# Patient Record
Sex: MALE | Race: WHITE | NOT HISPANIC OR LATINO | URBAN - METROPOLITAN AREA
[De-identification: names, ages, dates, MRNs, and addresses within clinical notes are randomized per-mention and may not be internally consistent; named-entity substitution may affect disease eponyms.]

---

## 2021-01-09 ENCOUNTER — INPATIENT (INPATIENT)
Facility: HOSPITAL | Age: 51
LOS: 4 days | Discharge: HOME | End: 2021-01-14
Attending: STUDENT IN AN ORGANIZED HEALTH CARE EDUCATION/TRAINING PROGRAM | Admitting: STUDENT IN AN ORGANIZED HEALTH CARE EDUCATION/TRAINING PROGRAM
Payer: COMMERCIAL

## 2021-01-09 VITALS
DIASTOLIC BLOOD PRESSURE: 83 MMHG | HEART RATE: 94 BPM | OXYGEN SATURATION: 94 % | RESPIRATION RATE: 19 BRPM | SYSTOLIC BLOOD PRESSURE: 134 MMHG | WEIGHT: 220.46 LBS | TEMPERATURE: 98 F

## 2021-01-09 DIAGNOSIS — J96.91 RESPIRATORY FAILURE, UNSPECIFIED WITH HYPOXIA: ICD-10-CM

## 2021-01-09 DIAGNOSIS — J12.82 PNEUMONIA DUE TO CORONAVIRUS DISEASE 2019: ICD-10-CM

## 2021-01-09 PROCEDURE — 99285 EMERGENCY DEPT VISIT HI MDM: CPT

## 2021-01-09 NOTE — ED ADULT TRIAGE NOTE - CHIEF COMPLAINT QUOTE
Pt tested positive  for covid on Monday 1/4. Pt complaining of sob and low O2 sat at home. Afebrile at triage. Denies chest pain.

## 2021-01-10 LAB
ALBUMIN SERPL ELPH-MCNC: 3.7 G/DL — SIGNIFICANT CHANGE UP (ref 3.5–5.2)
ALBUMIN SERPL ELPH-MCNC: 4 G/DL — SIGNIFICANT CHANGE UP (ref 3.5–5.2)
ALP SERPL-CCNC: 67 U/L — SIGNIFICANT CHANGE UP (ref 30–115)
ALP SERPL-CCNC: 69 U/L — SIGNIFICANT CHANGE UP (ref 30–115)
ALT FLD-CCNC: 55 U/L — HIGH (ref 0–41)
ALT FLD-CCNC: 57 U/L — HIGH (ref 0–41)
ANION GAP SERPL CALC-SCNC: 11 MMOL/L — SIGNIFICANT CHANGE UP (ref 7–14)
ANION GAP SERPL CALC-SCNC: 13 MMOL/L — SIGNIFICANT CHANGE UP (ref 7–14)
AST SERPL-CCNC: 59 U/L — HIGH (ref 0–41)
AST SERPL-CCNC: 60 U/L — HIGH (ref 0–41)
BASE EXCESS BLDV CALC-SCNC: 5.2 MMOL/L — HIGH (ref -2–2)
BASOPHILS # BLD AUTO: 0.01 K/UL — SIGNIFICANT CHANGE UP (ref 0–0.2)
BASOPHILS # BLD AUTO: 0.01 K/UL — SIGNIFICANT CHANGE UP (ref 0–0.2)
BASOPHILS NFR BLD AUTO: 0.1 % — SIGNIFICANT CHANGE UP (ref 0–1)
BASOPHILS NFR BLD AUTO: 0.2 % — SIGNIFICANT CHANGE UP (ref 0–1)
BILIRUB SERPL-MCNC: 1 MG/DL — SIGNIFICANT CHANGE UP (ref 0.2–1.2)
BILIRUB SERPL-MCNC: 1 MG/DL — SIGNIFICANT CHANGE UP (ref 0.2–1.2)
BUN SERPL-MCNC: 12 MG/DL — SIGNIFICANT CHANGE UP (ref 10–20)
BUN SERPL-MCNC: 13 MG/DL — SIGNIFICANT CHANGE UP (ref 10–20)
CA-I SERPL-SCNC: 1.08 MMOL/L — LOW (ref 1.12–1.3)
CALCIUM SERPL-MCNC: 8.3 MG/DL — LOW (ref 8.5–10.1)
CALCIUM SERPL-MCNC: 8.8 MG/DL — SIGNIFICANT CHANGE UP (ref 8.5–10.1)
CHLORIDE SERPL-SCNC: 100 MMOL/L — SIGNIFICANT CHANGE UP (ref 98–110)
CHLORIDE SERPL-SCNC: 96 MMOL/L — LOW (ref 98–110)
CO2 SERPL-SCNC: 25 MMOL/L — SIGNIFICANT CHANGE UP (ref 17–32)
CO2 SERPL-SCNC: 25 MMOL/L — SIGNIFICANT CHANGE UP (ref 17–32)
CREAT SERPL-MCNC: 0.9 MG/DL — SIGNIFICANT CHANGE UP (ref 0.7–1.5)
CREAT SERPL-MCNC: 1 MG/DL — SIGNIFICANT CHANGE UP (ref 0.7–1.5)
D DIMER BLD IA.RAPID-MCNC: 146 NG/ML DDU — SIGNIFICANT CHANGE UP (ref 0–230)
EOSINOPHIL # BLD AUTO: 0 K/UL — SIGNIFICANT CHANGE UP (ref 0–0.7)
EOSINOPHIL # BLD AUTO: 0 K/UL — SIGNIFICANT CHANGE UP (ref 0–0.7)
EOSINOPHIL NFR BLD AUTO: 0 % — SIGNIFICANT CHANGE UP (ref 0–8)
EOSINOPHIL NFR BLD AUTO: 0 % — SIGNIFICANT CHANGE UP (ref 0–8)
GAS PNL BLDV: 133 MMOL/L — LOW (ref 136–145)
GAS PNL BLDV: SIGNIFICANT CHANGE UP
GLUCOSE SERPL-MCNC: 107 MG/DL — HIGH (ref 70–99)
GLUCOSE SERPL-MCNC: 125 MG/DL — HIGH (ref 70–99)
HCO3 BLDV-SCNC: 30 MMOL/L — HIGH (ref 22–29)
HCT VFR BLD CALC: 42.8 % — SIGNIFICANT CHANGE UP (ref 42–52)
HCT VFR BLD CALC: 47 % — SIGNIFICANT CHANGE UP (ref 42–52)
HCT VFR BLDA CALC: 46.3 % — HIGH (ref 34–44)
HGB BLD CALC-MCNC: 15.1 G/DL — SIGNIFICANT CHANGE UP (ref 14–18)
HGB BLD-MCNC: 14.6 G/DL — SIGNIFICANT CHANGE UP (ref 14–18)
HGB BLD-MCNC: 15.4 G/DL — SIGNIFICANT CHANGE UP (ref 14–18)
IMM GRANULOCYTES NFR BLD AUTO: 1.2 % — HIGH (ref 0.1–0.3)
IMM GRANULOCYTES NFR BLD AUTO: 1.4 % — HIGH (ref 0.1–0.3)
LACTATE BLDV-MCNC: 1.3 MMOL/L — SIGNIFICANT CHANGE UP (ref 0.5–1.6)
LDH SERPL L TO P-CCNC: 328 U/L — HIGH (ref 50–242)
LYMPHOCYTES # BLD AUTO: 0.51 K/UL — LOW (ref 1.2–3.4)
LYMPHOCYTES # BLD AUTO: 0.52 K/UL — LOW (ref 1.2–3.4)
LYMPHOCYTES # BLD AUTO: 7.6 % — LOW (ref 20.5–51.1)
LYMPHOCYTES # BLD AUTO: 9.9 % — LOW (ref 20.5–51.1)
MCHC RBC-ENTMCNC: 28.6 PG — SIGNIFICANT CHANGE UP (ref 27–31)
MCHC RBC-ENTMCNC: 28.9 PG — SIGNIFICANT CHANGE UP (ref 27–31)
MCHC RBC-ENTMCNC: 32.8 G/DL — SIGNIFICANT CHANGE UP (ref 32–37)
MCHC RBC-ENTMCNC: 34.1 G/DL — SIGNIFICANT CHANGE UP (ref 32–37)
MCV RBC AUTO: 84.8 FL — SIGNIFICANT CHANGE UP (ref 80–94)
MCV RBC AUTO: 87.2 FL — SIGNIFICANT CHANGE UP (ref 80–94)
MONOCYTES # BLD AUTO: 0.37 K/UL — SIGNIFICANT CHANGE UP (ref 0.1–0.6)
MONOCYTES # BLD AUTO: 0.58 K/UL — SIGNIFICANT CHANGE UP (ref 0.1–0.6)
MONOCYTES NFR BLD AUTO: 7.2 % — SIGNIFICANT CHANGE UP (ref 1.7–9.3)
MONOCYTES NFR BLD AUTO: 8.5 % — SIGNIFICANT CHANGE UP (ref 1.7–9.3)
NEUTROPHILS # BLD AUTO: 4.17 K/UL — SIGNIFICANT CHANGE UP (ref 1.4–6.5)
NEUTROPHILS # BLD AUTO: 5.62 K/UL — SIGNIFICANT CHANGE UP (ref 1.4–6.5)
NEUTROPHILS NFR BLD AUTO: 81.3 % — HIGH (ref 42.2–75.2)
NEUTROPHILS NFR BLD AUTO: 82.6 % — HIGH (ref 42.2–75.2)
NRBC # BLD: 0 /100 WBCS — SIGNIFICANT CHANGE UP (ref 0–0)
NRBC # BLD: 0 /100 WBCS — SIGNIFICANT CHANGE UP (ref 0–0)
PCO2 BLDV: 44 MMHG — SIGNIFICANT CHANGE UP (ref 41–51)
PH BLDV: 7.45 — HIGH (ref 7.26–7.43)
PLATELET # BLD AUTO: 179 K/UL — SIGNIFICANT CHANGE UP (ref 130–400)
PLATELET # BLD AUTO: 200 K/UL — SIGNIFICANT CHANGE UP (ref 130–400)
PO2 BLDV: 29 MMHG — SIGNIFICANT CHANGE UP (ref 20–40)
POTASSIUM BLDV-SCNC: 3.8 MMOL/L — SIGNIFICANT CHANGE UP (ref 3.3–5.6)
POTASSIUM SERPL-MCNC: 4.1 MMOL/L — SIGNIFICANT CHANGE UP (ref 3.5–5)
POTASSIUM SERPL-MCNC: 4.7 MMOL/L — SIGNIFICANT CHANGE UP (ref 3.5–5)
POTASSIUM SERPL-SCNC: 4.1 MMOL/L — SIGNIFICANT CHANGE UP (ref 3.5–5)
POTASSIUM SERPL-SCNC: 4.7 MMOL/L — SIGNIFICANT CHANGE UP (ref 3.5–5)
PROT SERPL-MCNC: 6.5 G/DL — SIGNIFICANT CHANGE UP (ref 6–8)
PROT SERPL-MCNC: 6.7 G/DL — SIGNIFICANT CHANGE UP (ref 6–8)
RBC # BLD: 5.05 M/UL — SIGNIFICANT CHANGE UP (ref 4.7–6.1)
RBC # BLD: 5.39 M/UL — SIGNIFICANT CHANGE UP (ref 4.7–6.1)
RBC # FLD: 11.9 % — SIGNIFICANT CHANGE UP (ref 11.5–14.5)
RBC # FLD: 12 % — SIGNIFICANT CHANGE UP (ref 11.5–14.5)
SAO2 % BLDV: 55 % — SIGNIFICANT CHANGE UP
SARS-COV-2 RNA SPEC QL NAA+PROBE: DETECTED
SODIUM SERPL-SCNC: 132 MMOL/L — LOW (ref 135–146)
SODIUM SERPL-SCNC: 138 MMOL/L — SIGNIFICANT CHANGE UP (ref 135–146)
TROPONIN T SERPL-MCNC: <0.01 NG/ML — SIGNIFICANT CHANGE UP
WBC # BLD: 5.13 K/UL — SIGNIFICANT CHANGE UP (ref 4.8–10.8)
WBC # BLD: 6.81 K/UL — SIGNIFICANT CHANGE UP (ref 4.8–10.8)
WBC # FLD AUTO: 5.13 K/UL — SIGNIFICANT CHANGE UP (ref 4.8–10.8)
WBC # FLD AUTO: 6.81 K/UL — SIGNIFICANT CHANGE UP (ref 4.8–10.8)

## 2021-01-10 PROCEDURE — 93010 ELECTROCARDIOGRAM REPORT: CPT

## 2021-01-10 PROCEDURE — 99223 1ST HOSP IP/OBS HIGH 75: CPT

## 2021-01-10 PROCEDURE — 71045 X-RAY EXAM CHEST 1 VIEW: CPT | Mod: 26

## 2021-01-10 PROCEDURE — 99497 ADVNCD CARE PLAN 30 MIN: CPT | Mod: 25

## 2021-01-10 RX ORDER — ENOXAPARIN SODIUM 100 MG/ML
40 INJECTION SUBCUTANEOUS AT BEDTIME
Refills: 0 | Status: DISCONTINUED | OUTPATIENT
Start: 2021-01-10 | End: 2021-01-10

## 2021-01-10 RX ORDER — DEXAMETHASONE 0.5 MG/5ML
6 ELIXIR ORAL ONCE
Refills: 0 | Status: COMPLETED | OUTPATIENT
Start: 2021-01-10 | End: 2021-01-10

## 2021-01-10 RX ORDER — ENOXAPARIN SODIUM 100 MG/ML
40 INJECTION SUBCUTANEOUS EVERY 12 HOURS
Refills: 0 | Status: DISCONTINUED | OUTPATIENT
Start: 2021-01-10 | End: 2021-01-14

## 2021-01-10 RX ORDER — ALBUTEROL 90 UG/1
2 AEROSOL, METERED ORAL EVERY 6 HOURS
Refills: 0 | Status: DISCONTINUED | OUTPATIENT
Start: 2021-01-10 | End: 2021-01-14

## 2021-01-10 RX ORDER — CHLORHEXIDINE GLUCONATE 213 G/1000ML
1 SOLUTION TOPICAL DAILY
Refills: 0 | Status: DISCONTINUED | OUTPATIENT
Start: 2021-01-10 | End: 2021-01-14

## 2021-01-10 RX ADMIN — Medication 600 MILLIGRAM(S): at 17:11

## 2021-01-10 RX ADMIN — Medication 101.2 MILLIGRAM(S): at 01:04

## 2021-01-10 RX ADMIN — ALBUTEROL 2 PUFF(S): 90 AEROSOL, METERED ORAL at 10:51

## 2021-01-10 RX ADMIN — Medication 600 MILLIGRAM(S): at 10:51

## 2021-01-10 NOTE — H&P ADULT - HISTORY OF PRESENT ILLNESS
Patient is a 50 male with no significant Medical hx presenting to ED with CC of Worsening SOB. Patient was recently diagnosed with Covid-19 on 12/31/2020, he has been quarantining at home and checking his Pulse ox, however today he was saturating at 89% so he decided to come to ED. Pt also endorsed tactile fever, chills, non-productive cough ans sore throat. Denied Chest pain, abdominal pain, N/V/D     in ED: vital wnl, saturating 94% on RA   Labs with Lymphopenia amd Mild transaminitis

## 2021-01-10 NOTE — CONSULT NOTE ADULT - ASSESSMENT
50 male with no significant Medical hx presenting to ED with CC of Worsening SOB. Patient was recently diagnosed with Covid-19 on 12/31/2020, he has been quarantining at home and checking his Pulse ox, however today he was saturating at 89% so he decided to come to ED    IMPRESSION;  COVID 19 with moderate illness. SpO2 >94% and not requiring supplemental O2.  Pt is in the late inflammatory response phase ot the illness based on the onset of symptoms.  Ddimers 146    RECOMMENDATIONS;  Could finish course of steroids with po prednisone 40 mg q24 for a total of 10 days  recall prn please

## 2021-01-10 NOTE — ED PROVIDER NOTE - OBJECTIVE STATEMENT
50m no known pmh p/w fever/chills, COVID +, dyspnea. pt tested positive on 12/31, sxs have been worsening since. dyspnea is moderate in severity, worse on exertion. admits to dry cough, sore throat. denies cp, abd pain, n/v/d, lethargy.

## 2021-01-10 NOTE — H&P ADULT - NSHPLABSRESULTS_GEN_ALL_CORE
LABS:                          14.6   6.81  )-----------( 179      ( 10 Juan Pablo 2021 00:00 )             42.8     01-10    132<L>  |  96<L>  |  12  ----------------------------<  107<H>  4.1   |  25  |  0.9    Ca    8.3<L>      10 Juan Pablo 2021 00:00    TPro  6.5  /  Alb  3.7  /  TBili  1.0  /  DBili  x   /  AST  59<H>  /  ALT  55<H>  /  AlkPhos  67  01-10         Lactate Trend    CARDIAC MARKERS ( 10 Juan Pablo 2021 00:00 )  x     / <0.01 ng/mL / x     / x     / x          CAPILLARY BLOOD GLUCOSE    RADIOLOGY:    EKGS:

## 2021-01-10 NOTE — H&P ADULT - NSHPREVIEWOFSYSTEMS_GEN_ALL_CORE
CONSTITUTIONAL: Endorsed fevers or chills  EYES/ENT: No visual changes;  No vertigo or throat pain   NECK: No pain or stiffness  RESPIRATORY: Non-Productive  cough, +SOB, no  wheezing, hemoptysis  CARDIOVASCULAR: No chest pain or palpitations  GASTROINTESTINAL: No abdominal  pain. No nausea, vomiting,No diarrhea or constipation. No melena or hematochezia.  GENITOURINARY: No dysuria, frequency or hematuria  NEUROLOGICAL: No numbness or weakness  SKIN: No itching, rashes

## 2021-01-10 NOTE — CONSULT NOTE ADULT - SUBJECTIVE AND OBJECTIVE BOX
RONEN TEIXEIRA  50y, Male  Allergy: No Known Allergies      All historical available data reviewed.    HPI:  Patient is a 50 male with no significant Medical hx presenting to ED with CC of Worsening SOB. Patient was recently diagnosed with Covid-19 on 12/31/2020, he has been quarantining at home and checking his Pulse ox, however today he was saturating at 89% so he decided to come to ED. Pt also endorsed tactile fever, chills, non-productive cough ans sore throat. Denied Chest pain, abdominal pain, N/V/D     in ED: vital wnl, saturating 94% on RA   Labs with Lymphopenia amd Mild transaminitis  (10 Juan Pablo 2021 02:39)    FAMILY HISTORY:    PAST MEDICAL & SURGICAL HISTORY:        VITALS:  T(F): 98, Max: 98 (01-09-21 @ 23:07)  HR: 94  BP: 134/83  RR: 19Vital Signs Last 24 Hrs  T(C): 36.7 (09 Jan 2021 23:07), Max: 36.7 (09 Jan 2021 23:07)  T(F): 98 (09 Jan 2021 23:07), Max: 98 (09 Jan 2021 23:07)  HR: 94 (09 Jan 2021 23:07) (94 - 94)  BP: 134/83 (09 Jan 2021 23:07) (134/83 - 134/83)  BP(mean): --  RR: 19 (09 Jan 2021 23:07) (19 - 19)  SpO2: 94% (09 Jan 2021 23:07) (94% - 94%)    TESTS & MEASUREMENTS:                        14.6   6.81  )-----------( 179      ( 10 Juan Pablo 2021 00:00 )             42.8     01-10    132<L>  |  96<L>  |  12  ----------------------------<  107<H>  4.1   |  25  |  0.9    Ca    8.3<L>      10 Juan Pablo 2021 00:00    TPro  6.5  /  Alb  3.7  /  TBili  1.0  /  DBili  x   /  AST  59<H>  /  ALT  55<H>  /  AlkPhos  67  01-10    LIVER FUNCTIONS - ( 10 Juan Pablo 2021 00:00 )  Alb: 3.7 g/dL / Pro: 6.5 g/dL / ALK PHOS: 67 U/L / ALT: 55 U/L / AST: 59 U/L / GGT: x                   RADIOLOGY & ADDITIONAL TESTS:  Personal review of radiological diagnostics performed  Echo and EKG results noted when applicable.     MEDICATIONS:  chlorhexidine 4% Liquid 1 Application(s) Topical daily  enoxaparin Injectable 40 milliGRAM(s) SubCutaneous at bedtime      ANTIBIOTICS:

## 2021-01-10 NOTE — ED PROVIDER NOTE - CLINICAL SUMMARY MEDICAL DECISION MAKING FREE TEXT BOX
51 y/o male with no PMH who presents to ED for 8 days of covid19 sx. PT ahd positive test outpat, was checking o2 at home and noted oxygen dropped to 89% on exertion prompting ED visit. Pt c/o increased SOB over the past 8 days. CONSTITUTIONAL: Well-developed; well-nourished; in no acute distress.  SKIN: warm, dry HEAD: Normocephalic; atraumatic. EYES: no conjunctival injection. PERRL.  ENT: No nasal discharge; airway clear. NECK: Supple; non tender. CARD: S1, S2 normal; no murmurs, gallops, or rubs. Regular rate and rhythm.  RESP: No wheezes, rales or rhonchi. ABD: soft ntnd EXT: Normal ROM.  No clubbing, cyanosis or edema.  LYMPH: No acute cervical adenopathy. NEURO: Alert, oriented, grossly unremarkable PSYCH: Cooperative, appropriate. Plan: Labs/EKG admit for covid19 hypoxia.

## 2021-01-10 NOTE — ED PROVIDER NOTE - NS ED ROS FT
General: fever/chills.   Eyes:  No visual changes, eye pain or discharge.  ENMT:  No hearing changes, pain, no sore throat or runny nose, no difficulty swallowing  Cardiac:  No chest pain, SOB or edema. No chest pain with exertion.  Respiratory: cough, dyspnea.   GI:  No nausea, vomiting, diarrhea or abdominal pain.  :  No dysuria, frequency or burning.  MS:  No myalgia, muscle weakness, joint pain or back pain.  Neuro:  No headache.  No LOC. No change in ambulation. No dizziness.  Skin:  No skin rash.

## 2021-01-10 NOTE — ED PROVIDER NOTE - PHYSICAL EXAMINATION
Constitutional: Well developed, well nourished. tachypneic to 28.   Head: Atraumatic.  Eyes: PERRLA. EOMI without discomfort.   ENT: No nasal discharge. Mucous membranes moist.  Neck: Supple. Painless ROM.  Cardiovascular: Regular rhythm. Regular rate. Normal S1 and S2. No murmurs. 2+ pulses in all extremities.   Pulmonary: RR 28. B/l rhonchi.   Abdominal: Soft. Nondistended. Nontender. No rebound or guarding.   Extremities. Pelvis stable. No lower extremity edema. Symmetric calves.  Skin: No rashes.   Neuro: AAOx3. No focal neurological deficits.  Psych: Normal mood. Normal affect.

## 2021-01-10 NOTE — H&P ADULT - ASSESSMENT
Patient is a 50 male with no significant Medical hx presenting to ED with CC of Worsening SOB.    COVID-19 Pneumonia   Presenting with Sore throat, non- productive cough, fever and chills   -Labs significant for mild transaminitis and lymphopenia  -CXR: b/l Lower intersitial Infiltrates( pending official read)   -Doubt the need for Remdesivir given 10 days since diagnosis   -S/p decadron 6mg, continue for 9 more doses  -Supplemental Oxygen as needed   -LDH: 328 // Dimer:146, repeat dimer in 48hrs   -Ferritin, Procal and CRP ordered  -F/up ID recs     DVT PPX: Lovenox   GI PPX: not indicated   Full code  Dispo from Home  Pending clinical improvement

## 2021-01-10 NOTE — H&P ADULT - ATTENDING COMMENTS
50 male with no significant Medical hx admitted for progressive worsening SOB.    # Moderate COVID-19 Pneumonia   - hemodynamically stable  - 94% on RA in bed, 92% on RA while ambulating  - CXR: b/l Lower intersitial Infiltrates  - Supplemental Oxygen as needed   - c/w prednisone 40 mg q24 for a total of 8 days  - LDH: 328 /Dimer: 146    # DVT ppx    # Possible transfer to [facility] (pt agreeable)

## 2021-01-10 NOTE — H&P ADULT - NSHPPHYSICALEXAM_GEN_ALL_CORE
T(C): 36.6 (01-10-21 @ 16:33), Max: 36.7 (01-09-21 @ 23:07)  HR: 95 (01-10-21 @ 16:33) (75 - 95)  BP: 134/76 (01-10-21 @ 16:33) (130/74 - 134/83)  RR: 18 (01-10-21 @ 16:33) (16 - 19)  SpO2: 96% (01-10-21 @ 16:33) (94% - 96%)        GENERAL: NAD, well-developed  HEAD:  Atraumatic, Normocephalic  EYES: EOMI, PERRLA, conjunctiva and sclera clear  ENT: Normal tympanic membrane. No nasal obstruction or discharge. No tonsillar exudate, swelling or erythema.  NECK: Supple, No JVD  CHEST/LUNG: Clear to auscultation bilaterally; No wheeze  HEART: Regular rate and rhythm; No murmurs, rubs, or gallops  ABDOMEN: Soft, Nontender, Nondistended; Bowel sounds present  EXTREMITIES:  2+ Peripheral Pulses, No clubbing, cyanosis, or edema  PSYCH: AAOx3  NEUROLOGY: non-focal  SKIN: No rashes or lesions

## 2021-01-11 LAB
CRP SERPL-MCNC: 5.66 MG/DL — HIGH (ref 0–0.4)
FERRITIN SERPL-MCNC: 656 NG/ML — HIGH (ref 30–400)
PROCALCITONIN SERPL-MCNC: 0.07 NG/ML — SIGNIFICANT CHANGE UP (ref 0.02–0.1)

## 2021-01-11 RX ADMIN — ALBUTEROL 2 PUFF(S): 90 AEROSOL, METERED ORAL at 02:16

## 2021-01-11 RX ADMIN — Medication 600 MILLIGRAM(S): at 17:21

## 2021-01-11 RX ADMIN — ALBUTEROL 2 PUFF(S): 90 AEROSOL, METERED ORAL at 17:40

## 2021-01-11 RX ADMIN — CHLORHEXIDINE GLUCONATE 1 APPLICATION(S): 213 SOLUTION TOPICAL at 17:21

## 2021-01-11 RX ADMIN — ENOXAPARIN SODIUM 40 MILLIGRAM(S): 100 INJECTION SUBCUTANEOUS at 17:21

## 2021-01-11 RX ADMIN — Medication 40 MILLIGRAM(S): at 05:05

## 2021-01-11 RX ADMIN — ENOXAPARIN SODIUM 40 MILLIGRAM(S): 100 INJECTION SUBCUTANEOUS at 05:04

## 2021-01-11 RX ADMIN — Medication 600 MILLIGRAM(S): at 05:05

## 2021-01-11 NOTE — PROGRESS NOTE ADULT - ASSESSMENT
Name: Dakota Marx   Age: 50 M   HOD: 2    50 year old male, no pmh, COVID+ 12/31, transferred to [facility] @ 430am 1/11. Patient 84-95% ORA at rest, desaturation mid 80s during ambulation.       Allergies:  No Known Allergies      PHYSICAL EXAM:    Vitals:  Vital Signs Last 24 Hrs  T(C): 36.6 (10 Juan Pablo 2021 04:34), Max: 36.8 (09 Jan 2021 20:42)  T(F): 97.9 (11 Jan 2021 06:03)  HR: 73 (11 Jan 2021 06:03)   BP: 156/73 (11 Jan 2021 06:03)  RR: 18 (11 Jan 2021 06:03)  SpO2: 97% (11 Jan 2021 06:03)    GENERAL: NAD  CHEST/LUNG: CTAB  HEART: S1,S2 RRR  ABDOMEN: Soft, NT/ND, +BS  EXTREMITIES:  1+ DP, no LE edema    MEDICATIONS:   Albuterol 90 MICROgram(s) HFA Inhaler 2 puffs q6hr PRN   chlorhexidine 4% Liquid 1 Application(s) Topical   enoxaparin Injectable 40 milliGRAM(s) SubCutaneous daily  Prednisone 40 miliGRAM(s) Oral Daily     Physical Exam:    VITALS: Last 24 hours   T(C): 36.6 (10 Juan Pablo 2021 04:34), Max: 36.8 (09 Jan 2021 20:42)  T(F): 97.9 (10 Juan Pablo 2021 04:34), Max: 98.3 (09 Jan 2021 20:42)  HR: 75 (10 Juan Pablo 2021 04:34) (70 - 75)  BP: 147/75 (10 Juan Pablo 2021 04:34) (107/57 - 147/75)  BP(mean): --  RR: 19 (10 Juan Pablo 2021 04:34) (19 - 19)  SpO2: 93% (10 Juan Pablo 2021 04:34) (93% - 95%)    GENERAL: NAD  CHEST/LUNG: CTAB  HEART: S1,S2 RRR  ABDOMEN: Soft, NT/ND, +BS  EXTREMITIES: Moving all EXT     Assessment and Plan   #Respiratory Failure 2/2 COVID  -Currently requiring O2 during ambulation  -Continue dexamethasone     Plan:  -Dispo: D/C home s/p medical improvement.     Assessment and Plan   #Respiratory Failure 2/2 COVID  -Currently requiring O2 during ambulation  -Continue dexamethasone   - Goal SaO2>90%  -Activity as tolerated    Plan:  -Dispo: D/C home s/p medical improvement.

## 2021-01-11 NOTE — PROGRESS NOTE ADULT - SUBJECTIVE AND OBJECTIVE BOX
Name: Dakota Marx   Age: 50 M     50 year old male, no pmh, COVID+ 12/31, transferred to [facility] @ 430am 1/11. Patient 84-95% ORA at rest, desaturation mid 80s during ambulation.     Allergies:  No Known Allergies      PHYSICAL EXAM:    Vitals:  Vital Signs Last 24 Hrs  T(C): 36.6 (10 Juan Pablo 2021 04:34), Max: 36.8 (09 Jan 2021 20:42)  T(F): 97.9 (11 Jan 2021 06:03)  HR: 73 (11 Jan 2021 06:03)   BP: 156/73 (11 Jan 2021 06:03)  RR: 18 (11 Jan 2021 06:03)  SpO2: 97% (11 Jan 2021 06:03)    GENERAL: NAD  CHEST/LUNG: CTAB  HEART: S1,S2 RRR  ABDOMEN: Soft, NT/ND, +BS  EXTREMITIES:  1+ DP, no LE edema    MEDICATIONS:   Albuterol 90 MICROgram(s) HFA Inhaler 2 puffs q6hr PRN   chlorhexidine 4% Liquid 1 Application(s) Topical   enoxaparin Injectable 40 milliGRAM(s) SubCutaneous daily  Prednisone 40 miliGRAM(s) Oral Daily     Physical Exam:    VITALS: Last 24 hours   T(C): 36.6 (10 Juan Pablo 2021 04:34), Max: 36.8 (09 Jan 2021 20:42)  T(F): 97.9 (10 Juan Pablo 2021 04:34), Max: 98.3 (09 Jan 2021 20:42)  HR: 75 (10 Juan Pablo 2021 04:34) (70 - 75)  BP: 147/75 (10 Juan Pablo 2021 04:34) (107/57 - 147/75)  BP(mean): --  RR: 19 (10 Juan Pablo 2021 04:34) (19 - 19)  SpO2: 93% (10 Juan Pablo 2021 04:34) (93% - 95%)    GENERAL: NAD  CHEST/LUNG: CTAB  HEART: S1,S2 RRR  ABDOMEN: Soft, NT/ND, +BS  EXTREMITIES: Moving all EXT

## 2021-01-11 NOTE — OCCUPATIONAL THERAPY INITIAL EVALUATION ADULT - GENERAL OBSERVATIONS, REHAB EVAL
Pt encountered semi tello in bed in NAD, + IV locked. Pt agreeable to bedside OT assessment, may be seen as confirmed with RN. Pt returned to bed in NAD, + IV locked RN aware

## 2021-01-11 NOTE — CHART NOTE - NSCHARTNOTEFT_GEN_A_CORE
Dakota TEIXEIRA - 49yo M    50yoM transferred to [facility] on 1/11/21 at approx 4:30AM -- seen and examined at bedside, with a SpO2 of 96%

## 2021-01-12 RX ADMIN — Medication 600 MILLIGRAM(S): at 05:02

## 2021-01-12 RX ADMIN — Medication 40 MILLIGRAM(S): at 05:02

## 2021-01-12 RX ADMIN — CHLORHEXIDINE GLUCONATE 1 APPLICATION(S): 213 SOLUTION TOPICAL at 11:09

## 2021-01-12 RX ADMIN — ENOXAPARIN SODIUM 40 MILLIGRAM(S): 100 INJECTION SUBCUTANEOUS at 05:02

## 2021-01-12 RX ADMIN — ENOXAPARIN SODIUM 40 MILLIGRAM(S): 100 INJECTION SUBCUTANEOUS at 16:46

## 2021-01-12 RX ADMIN — Medication 600 MILLIGRAM(S): at 16:46

## 2021-01-12 NOTE — PHYSICAL THERAPY INITIAL EVALUATION ADULT - ADDITIONAL COMMENTS
Patient lives with family in a house with 6 steps to enter and 1 flight to bed room. Was independent in ADL's and ambulation without assistive device.

## 2021-01-12 NOTE — PROGRESS NOTE ADULT - SUBJECTIVE AND OBJECTIVE BOX
Name: Dakota Marx   Age: 50 M     HOD: 2D  Patient is a 50y old  Male who presents with a chief complaint of Dyspnea (11 Jan 2021 08:51)    Patient was seen and examined at bedside. Currently offers no acute medical complaints.     Allergies:  No Known Allergies      PHYSICAL EXAM:    Vitals:  Vital Signs Last 24 Hrs  T(C): 37 (12 Jan 2021 05:11), Max: 37.1 (11 Jan 2021 12:10)  T(F): 98.6 (12 Jan 2021 05:11), Max: 98.8 (11 Jan 2021 12:10)  HR: 78 (12 Jan 2021 05:11) (73 - 86)  BP: 122/71 (12 Jan 2021 05:11) (121/70 - 127/60)  RR: 19 (12 Jan 2021 05:11) (19 - 20)  SpO2: 97% (12 Jan 2021 05:11) (92% - 97%)    GENERAL: NAD  CHEST/LUNG: CTAB  HEART: S1,S2 RRR  ABDOMEN: Soft, NT/ND, +BS  EXTREMITIES:  1+ DP, no LE edema, moving all extremities     MEDICATIONS  (STANDING):  chlorhexidine 4% Liquid 1 Application(s) Topical daily  enoxaparin Injectable 40 milliGRAM(s) SubCutaneous every 12 hours  guaiFENesin  milliGRAM(s) Oral every 12 hours  predniSONE   Tablet 40 milliGRAM(s) Oral daily    MEDICATIONS  (PRN):  ALBUTerol    90 MICROgram(s) HFA Inhaler 2 Puff(s) Inhalation every 6 hours PRN Bronchospasm     Name: Dakota Marx   Age: 50 M     HOD: 2D  Patient is a 50y old  Male who presents with a chief complaint of Dyspnea (11 Jan 2021 08:51)    Patient was seen and examined at bedside. Patient states he is congestive and has been coughing. Admits to ambulating yesterday but got tired. otherwise, offers no other acute medical complaints.     Allergies:  No Known Allergies      PHYSICAL EXAM:    Vitals:  Vital Signs Last 24 Hrs  T(C): 37 (12 Jan 2021 05:11), Max: 37.1 (11 Jan 2021 12:10)  T(F): 98.6 (12 Jan 2021 05:11), Max: 98.8 (11 Jan 2021 12:10)  HR: 78 (12 Jan 2021 05:11) (73 - 86)  BP: 122/71 (12 Jan 2021 05:11) (121/70 - 127/60)  RR: 19 (12 Jan 2021 05:11) (19 - 20)  SpO2: 97% (12 Jan 2021 05:11) (92% - 97%)    GENERAL: NAD  CHEST/LUNG: CTAB  HEART: S1,S2 RRR  ABDOMEN: Soft, NT/ND, +BS  EXTREMITIES:  1+ DP, no LE edema, moving all extremities     MEDICATIONS  (STANDING):  chlorhexidine 4% Liquid 1 Application(s) Topical daily  enoxaparin Injectable 40 milliGRAM(s) SubCutaneous every 12 hours  guaiFENesin  milliGRAM(s) Oral every 12 hours  predniSONE   Tablet 40 milliGRAM(s) Oral daily    MEDICATIONS  (PRN):  ALBUTerol    90 MICROgram(s) HFA Inhaler 2 Puff(s) Inhalation every 6 hours PRN Bronchospasm

## 2021-01-12 NOTE — CHART NOTE - NSCHARTNOTEFT_GEN_A_CORE
Had a depth phone conversation regarding patient's current clinical condition, diagnosis, therapy, further options for care, and plan with [facility] over the phone   All questions answered at length to the satisfaction of all participants.   Patient and family encouraged to contact PA with any further issues.     Conversation start time: 11:28  Conversation end time: 11:32  Total time of conversation: 4 minutes

## 2021-01-12 NOTE — PROGRESS NOTE ADULT - ASSESSMENT
Assessment and Plan   #Respiratory Failure 2/2 COVID  - Currently requiring O2 during ambulation, saturates well at rest   - Continue Prednisone   - Goal SaO2>90%  - Activity as tolerated    Plan:  - Dispo: D/C home s/p medical improvement.     Assessment and Plan   #Respiratory Failure 2/2 COVID  - Currently requiring O2 during ambulation, saturates well at rest   - Continue Prednisone   - Goal SaO2>90%, currently on 2L saturating 92-97%   - Activity as tolerated  - Cough medicine standing     Plan:  - Dispo: D/C home s/p medical improvement.     Assessment and Plan   #Respiratory Failure 2/2 COVID  - Currently requiring O2 during ambulation, saturates well at rest   - Continue Prednisone   - Goal SaO2>90%, currently on 2L saturating 92-97%   - Activity as tolerated  - Mucinex for cough and congestion    Plan:  - Dispo: D/C home s/p medical improvement.

## 2021-01-12 NOTE — PHYSICAL THERAPY INITIAL EVALUATION ADULT - PLANNED THERAPY INTERVENTIONS, PT EVAL
Patient independent in ambulation and Close supervision in stairs for safety.  No PT intervention at this time. Contact PT when status changes.

## 2021-01-13 RX ADMIN — Medication 600 MILLIGRAM(S): at 05:16

## 2021-01-13 RX ADMIN — Medication 600 MILLIGRAM(S): at 17:16

## 2021-01-13 RX ADMIN — Medication 40 MILLIGRAM(S): at 05:16

## 2021-01-13 RX ADMIN — ENOXAPARIN SODIUM 40 MILLIGRAM(S): 100 INJECTION SUBCUTANEOUS at 05:16

## 2021-01-13 RX ADMIN — ENOXAPARIN SODIUM 40 MILLIGRAM(S): 100 INJECTION SUBCUTANEOUS at 17:16

## 2021-01-13 NOTE — CHART NOTE - NSCHARTNOTEFT_GEN_A_CORE
Notified family: Max  Update given: Pt stable, off oxygen today. Can go home tomorrow if he tolerates RA.  All questions and concerns addressed to family's satisfaction.  Family encouraged to contact me with any further concerns.

## 2021-01-13 NOTE — PROGRESS NOTE ADULT - ASSESSMENT
#Respiratory Failure 2/2 COVID  - Currently requiring O2 during ambulation, saturates well at rest   - Continue Prednisone   - Goal SaO2>90%, currently on 2L saturating 92-97%   - Activity as tolerated  - Mucinex for cough and congestion    Plan:  - Dispo: D/C home s/p medical improvement.    #Respiratory Failure 2/2 COVID  - O2 sat 94-96% on RA. Oxygen taken off today, will reassess in AM  - Continue Prednisone   - Activity as tolerated  - Mucinex & tessalon perles for cough and congestion    Plan:  - Dispo: D/C home s/p medical improvement.

## 2021-01-13 NOTE — PROGRESS NOTE ADULT - SUBJECTIVE AND OBJECTIVE BOX
Name: RONEN TEIXEIRA  Age: 50y  Gender: Male        HOD 3  Pt was seen and examined.       Allergies:  No Known Allergies      PHYSICAL EXAM:    Vitals:  Vital Signs Last 24 Hrs  T(C): 36.7 (13 Jan 2021 05:04), Max: 36.7 (12 Jan 2021 20:13)  T(F): 98 (13 Jan 2021 05:04), Max: 98.1 (12 Jan 2021 20:13)  HR: 67 (13 Jan 2021 05:04) (67 - 83)  BP: 127/70 (13 Jan 2021 05:04) (124/80 - 127/70)  BP(mean): --   RR: 18 (13 Jan 2021 05:04) (18 - 18)  SpO2: 97% (13 Jan 2021 05:04) (95% - 97%)    GENERAL: NAD  CHEST/LUNG: CTAB  HEART: S1,S2 RRR  ABDOMEN: Soft, NT/ND, +BS  EXTREMITIES:  2+ DP, no LE edema      LABS:                  MEDICATIONS  (STANDING):  chlorhexidine 4% Liquid 1 Application(s) Topical daily  enoxaparin Injectable 40 milliGRAM(s) SubCutaneous every 12 hours  guaiFENesin  milliGRAM(s) Oral every 12 hours  predniSONE   Tablet 40 milliGRAM(s) Oral daily        RADIOLOGY & ADDITIONAL TESTS:    Imaging Personally Reviewed:  [x] YES  [ ] NO

## 2021-01-14 VITALS
TEMPERATURE: 99 F | RESPIRATION RATE: 18 BRPM | OXYGEN SATURATION: 97 % | SYSTOLIC BLOOD PRESSURE: 124 MMHG | DIASTOLIC BLOOD PRESSURE: 77 MMHG | HEART RATE: 74 BPM

## 2021-01-14 RX ORDER — ASPIRIN/CALCIUM CARB/MAGNESIUM 324 MG
1 TABLET ORAL
Qty: 30 | Refills: 0
Start: 2021-01-14 | End: 2021-02-12

## 2021-01-14 RX ADMIN — ENOXAPARIN SODIUM 40 MILLIGRAM(S): 100 INJECTION SUBCUTANEOUS at 05:50

## 2021-01-14 RX ADMIN — Medication 40 MILLIGRAM(S): at 05:49

## 2021-01-14 RX ADMIN — Medication 600 MILLIGRAM(S): at 05:49

## 2021-01-14 NOTE — DISCHARGE NOTE PROVIDER - NSDCMRMEDTOKEN_GEN_ALL_CORE_FT
aspirin 81 mg oral delayed release tablet: 1 tab(s) orally once a day   predniSONE 20 mg oral tablet: 2 tab(s) orally once a day

## 2021-01-14 NOTE — PROGRESS NOTE ADULT - SUBJECTIVE AND OBJECTIVE BOX
Name: RONEN TEIXEIRA  Age: 50y  Gender: Male    HOD 4  Pt doing well on RA, ambulating without difficulty. No sob or cp.    Allergies:  No Known Allergies    VITALS:  Vital Signs Last 24 Hrs  T(C): 37.1 (14 Jan 2021 05:24), Max: 37.3 (13 Jan 2021 21:44)  T(F): 98.8 (14 Jan 2021 05:24), Max: 99.2 (13 Jan 2021 21:44)  HR: 81 (14 Jan 2021 05:24) (74 - 81)  BP: 128/70 (14 Jan 2021 05:24) (128/70 - 139/77)  BP(mean): --  RR: 18 (14 Jan 2021 05:24) (18 - 18)  SpO2: 95% (14 Jan 2021 05:24) (95% - 97%)    GEN: no acute distress, on RA  RESP: lungs clear to auscultation bilaterally, no increased work of breathing  CV: regular rate, regular rhythm  ABD: soft, nontender, nondistended  EXT: no edema, no calf tenderness  NEURO: A&Ox4, grossly normal    MEDICATIONS  (STANDING):  chlorhexidine 4% Liquid 1 Application(s) Topical daily  enoxaparin Injectable 40 milliGRAM(s) SubCutaneous every 12 hours  guaiFENesin  milliGRAM(s) Oral every 12 hours  predniSONE   Tablet 40 milliGRAM(s) Oral daily    MEDICATIONS  (PRN):  ALBUTerol    90 MICROgram(s) HFA Inhaler 2 Puff(s) Inhalation every 6 hours PRN Bronchospasm

## 2021-01-14 NOTE — DISCHARGE NOTE PROVIDER - NSDCCPCAREPLAN_GEN_ALL_CORE_FT
PRINCIPAL DISCHARGE DIAGNOSIS  Diagnosis: COVID-19  Assessment and Plan of Treatment: You tested positive for COVID-19 on January 10th. Please quarantine for 10-14 days after the onset of your symptoms. Call your doctor if your oxygenation level falls below 88% while at rest.

## 2021-01-14 NOTE — DISCHARGE NOTE NURSING/CASE MANAGEMENT/SOCIAL WORK - PATIENT PORTAL LINK FT
You can access the FollowMyHealth Patient Portal offered by [facility] by registering at the following website: [email or web address]. By joining [employer]’s FollowMyHealth portal, you will also be able to view your health information using other applications (apps) compatible with our system.

## 2021-01-14 NOTE — PROGRESS NOTE ADULT - ATTENDING COMMENTS
Patient seen and evaluated at bedside. Patient reports feeling better, but needs supplemental oxygen after activities. Tolerating room air at rest. Continue to monitor condition. Plan for discharge to home when not requiring supplemental oxygen with activities.
50 yr old patient on O2 this morning with COVID. Weaning trial off O2 today. Walked with patient, saturating at around 98% without oxygen and was able to walk from room to nursing station without assistance. Patient anxious, will watch O2 sats for today and see how he does. Otherwise doing well. ROS otherwise neg. Patient seen and examined with above provider. Agree with history, physical and exam except where edited and annotated.
Patient seen and evaluated at bedside. Trial of mucinex for cough and congestion. Monitor supplemental oxygen needs. Consider discharge with home oxygen depending on SaO2 with ambulation. Monitor condition. Encourage ambulation as tolerated to prevent deconditioning.
Patient seen this morning in no acute distress. 50 yr old male with COVID and no PMHx. Patient doing well on RA, saturating 97-99% when walking without O2. Patient cleared for discharge with close follow up with PCP. Discussed use of aspirin on discharge. All questions answered. Patient seen and examined with above provider. Agree with history, physical and exam except where edited and annotated.

## 2021-01-14 NOTE — CHART NOTE - NSCHARTNOTEFT_GEN_A_CORE
Notified family: Maria Isabel [phone]  Update given: pt ready for d/c home today, son will come pick him up  All questions and concerns addressed to family's satisfaction.  Family encouraged to contact me with any further concerns.

## 2021-01-14 NOTE — DISCHARGE NOTE PROVIDER - HOSPITAL COURSE
47 yo M with no PMHx who presented to ED with worsening dyspnea since 12/31.Tested covid (+) as outpatient and then had home pulse ox 89% on RA so went to ED. On admission pt was satting > 94% on RA and did not require supplemental O2. Seen by ID who recommended 10 day course of prednisone. Pt has been doing well with PT and remained stable on RA here. Rest of prednisone sent to pharmacy. Pt to f/u with PMD. 47 yo M with no PMHx who presented to ED with worsening dyspnea since 12/31.Tested covid (+) as outpatient and then had home pulse ox 89% on RA so went to ED. On admission pt was satting > 94% on RA and did not require supplemental O2. Seen by ID who recommended 10 day course of prednisone. Pt has been doing well with PT and remained stable on RA here. Rest of prednisone sent to pharmacy. ASA 81mg sent for ppx. Pt provided with home pulse ox and instructed to f/u with PMD.

## 2021-01-14 NOTE — PROGRESS NOTE ADULT - ASSESSMENT
51 yo M with no PMHx who presents with exertional dyspnea x 10 days after testing covid (+) as outpatient. Admitted for hypoxia at home.    #Respiratory failure 2/2 COVID  - satting 95-97% on RA  - per ID: continue prednisone for 10 day course (to end 1/10)  - mucinex, tessalon perles for cough    DVT ppx: lovenox (pt to go home with ASA x1 mo)  GI ppx: N/A  Activity: increase as tolerated  Dispo: d/c home today

## 2021-01-19 DIAGNOSIS — U07.1 COVID-19: ICD-10-CM

## 2021-01-19 DIAGNOSIS — Z00.6 ENCOUNTER FOR EXAMINATION FOR NORMAL COMPARISON AND CONTROL IN CLINICAL RESEARCH PROGRAM: ICD-10-CM

## 2021-01-19 DIAGNOSIS — J96.00 ACUTE RESPIRATORY FAILURE, UNSPECIFIED WHETHER WITH HYPOXIA OR HYPERCAPNIA: ICD-10-CM

## 2022-11-28 ENCOUNTER — EMERGENCY (EMERGENCY)
Facility: HOSPITAL | Age: 52
LOS: 0 days | Discharge: HOME | End: 2022-11-29
Attending: EMERGENCY MEDICINE | Admitting: EMERGENCY MEDICINE

## 2022-11-28 VITALS
HEART RATE: 78 BPM | HEIGHT: 68 IN | SYSTOLIC BLOOD PRESSURE: 186 MMHG | TEMPERATURE: 96 F | WEIGHT: 199.96 LBS | RESPIRATION RATE: 19 BRPM | DIASTOLIC BLOOD PRESSURE: 110 MMHG | OXYGEN SATURATION: 100 %

## 2022-11-28 DIAGNOSIS — Z79.82 LONG TERM (CURRENT) USE OF ASPIRIN: ICD-10-CM

## 2022-11-28 DIAGNOSIS — R00.1 BRADYCARDIA, UNSPECIFIED: ICD-10-CM

## 2022-11-28 DIAGNOSIS — I45.10 UNSPECIFIED RIGHT BUNDLE-BRANCH BLOCK: ICD-10-CM

## 2022-11-28 DIAGNOSIS — Z87.891 PERSONAL HISTORY OF NICOTINE DEPENDENCE: ICD-10-CM

## 2022-11-28 DIAGNOSIS — R10.31 RIGHT LOWER QUADRANT PAIN: ICD-10-CM

## 2022-11-28 DIAGNOSIS — I10 ESSENTIAL (PRIMARY) HYPERTENSION: ICD-10-CM

## 2022-11-28 LAB
ALBUMIN SERPL ELPH-MCNC: 4.7 G/DL — SIGNIFICANT CHANGE UP (ref 3.5–5.2)
ALP SERPL-CCNC: 88 U/L — SIGNIFICANT CHANGE UP (ref 30–115)
ALT FLD-CCNC: 17 U/L — SIGNIFICANT CHANGE UP (ref 0–41)
ANION GAP SERPL CALC-SCNC: 14 MMOL/L — SIGNIFICANT CHANGE UP (ref 7–14)
AST SERPL-CCNC: 18 U/L — SIGNIFICANT CHANGE UP (ref 0–41)
BASOPHILS # BLD AUTO: 0.07 K/UL — SIGNIFICANT CHANGE UP (ref 0–0.2)
BASOPHILS NFR BLD AUTO: 0.9 % — SIGNIFICANT CHANGE UP (ref 0–1)
BILIRUB DIRECT SERPL-MCNC: <0.2 MG/DL — SIGNIFICANT CHANGE UP (ref 0–0.3)
BILIRUB INDIRECT FLD-MCNC: >0.9 MG/DL — SIGNIFICANT CHANGE UP (ref 0.2–1.2)
BILIRUB SERPL-MCNC: 1.1 MG/DL — SIGNIFICANT CHANGE UP (ref 0.2–1.2)
BUN SERPL-MCNC: 12 MG/DL — SIGNIFICANT CHANGE UP (ref 10–20)
CALCIUM SERPL-MCNC: 9.9 MG/DL — SIGNIFICANT CHANGE UP (ref 8.4–10.5)
CHLORIDE SERPL-SCNC: 99 MMOL/L — SIGNIFICANT CHANGE UP (ref 98–110)
CO2 SERPL-SCNC: 24 MMOL/L — SIGNIFICANT CHANGE UP (ref 17–32)
CREAT SERPL-MCNC: 1.1 MG/DL — SIGNIFICANT CHANGE UP (ref 0.7–1.5)
EGFR: 80 ML/MIN/1.73M2 — SIGNIFICANT CHANGE UP
EOSINOPHIL # BLD AUTO: 0.15 K/UL — SIGNIFICANT CHANGE UP (ref 0–0.7)
EOSINOPHIL NFR BLD AUTO: 2 % — SIGNIFICANT CHANGE UP (ref 0–8)
GLUCOSE SERPL-MCNC: 88 MG/DL — SIGNIFICANT CHANGE UP (ref 70–99)
HCT VFR BLD CALC: 46.5 % — SIGNIFICANT CHANGE UP (ref 42–52)
HGB BLD-MCNC: 16.2 G/DL — SIGNIFICANT CHANGE UP (ref 14–18)
IMM GRANULOCYTES NFR BLD AUTO: 0.5 % — HIGH (ref 0.1–0.3)
LIDOCAIN IGE QN: 32 U/L — SIGNIFICANT CHANGE UP (ref 7–60)
LYMPHOCYTES # BLD AUTO: 2.11 K/UL — SIGNIFICANT CHANGE UP (ref 1.2–3.4)
LYMPHOCYTES # BLD AUTO: 27.7 % — SIGNIFICANT CHANGE UP (ref 20.5–51.1)
MCHC RBC-ENTMCNC: 29.8 PG — SIGNIFICANT CHANGE UP (ref 27–31)
MCHC RBC-ENTMCNC: 34.8 G/DL — SIGNIFICANT CHANGE UP (ref 32–37)
MCV RBC AUTO: 85.5 FL — SIGNIFICANT CHANGE UP (ref 80–94)
MONOCYTES # BLD AUTO: 0.85 K/UL — HIGH (ref 0.1–0.6)
MONOCYTES NFR BLD AUTO: 11.1 % — HIGH (ref 1.7–9.3)
NEUTROPHILS # BLD AUTO: 4.41 K/UL — SIGNIFICANT CHANGE UP (ref 1.4–6.5)
NEUTROPHILS NFR BLD AUTO: 57.8 % — SIGNIFICANT CHANGE UP (ref 42.2–75.2)
NRBC # BLD: 0 /100 WBCS — SIGNIFICANT CHANGE UP (ref 0–0)
PLATELET # BLD AUTO: 227 K/UL — SIGNIFICANT CHANGE UP (ref 130–400)
POTASSIUM SERPL-MCNC: 4.6 MMOL/L — SIGNIFICANT CHANGE UP (ref 3.5–5)
POTASSIUM SERPL-SCNC: 4.6 MMOL/L — SIGNIFICANT CHANGE UP (ref 3.5–5)
PROT SERPL-MCNC: 7.3 G/DL — SIGNIFICANT CHANGE UP (ref 6–8)
RBC # BLD: 5.44 M/UL — SIGNIFICANT CHANGE UP (ref 4.7–6.1)
RBC # FLD: 11.9 % — SIGNIFICANT CHANGE UP (ref 11.5–14.5)
SODIUM SERPL-SCNC: 137 MMOL/L — SIGNIFICANT CHANGE UP (ref 135–146)
WBC # BLD: 7.63 K/UL — SIGNIFICANT CHANGE UP (ref 4.8–10.8)
WBC # FLD AUTO: 7.63 K/UL — SIGNIFICANT CHANGE UP (ref 4.8–10.8)

## 2022-11-28 PROCEDURE — 99285 EMERGENCY DEPT VISIT HI MDM: CPT

## 2022-11-28 PROCEDURE — 93010 ELECTROCARDIOGRAM REPORT: CPT

## 2022-11-28 NOTE — ED ADULT TRIAGE NOTE - CHIEF COMPLAINT QUOTE
Pt states " I have right sided abdominal pain for one month. I feel it in my back and lower part of stomach too"

## 2022-11-28 NOTE — ED PROVIDER NOTE - NS ED ROS FT
CONST: No fever, chills or bodyaches  EYES: No pain, redness, drainage or visual changes.  ENT: No nasal discharge or congestion. No sore throat  CARD: No chest pain, palpitations  RESP: No SOB, cough, hemoptysis. No hx of asthma or COPD  GI: right sided abdominal pain. Denies N/V/D  MS: No joint pain, back pain or extremity pain/injury  SKIN: No rashes  NEURO: No headache, dizziness, paresthesias or LOC

## 2022-11-28 NOTE — ED PROVIDER NOTE - CLINICAL SUMMARY MEDICAL DECISION MAKING FREE TEXT BOX
51yM right-sided abdominal/flank pain on and off for the last month.  States pain felt worse today.  No nausea vomiting or diarrhea, no fever or chills, Labs within normal limits no microscopic hematuria no UTI.  CT abdomen pelvisNo CT evidence of acute intra-abdominal pathology. No nephrolithiasis   	bilaterally.  Patient to be discharged from ED in well appearing condition. Any available test results were discussed with and printed  for patient.  Verbal instructions given, including instructions to return to ED immediately for any new, worsening, or concerning symptoms. Limitations of ED work up discussed.  Patient reports understanding of above with capacity and insight. Written discharge instructions additionally given, including follow-up plan.

## 2022-11-28 NOTE — ED PROVIDER NOTE - PATIENT PORTAL LINK FT
You can access the FollowMyHealth Patient Portal offered by [facility] by registering at the following website: [email or web address]. By joining [facility]’s FollowMyHealth portal, you will also be able to view your health information using other applications (apps) compatible with our system.

## 2022-11-28 NOTE — ED PROVIDER NOTE - OBJECTIVE STATEMENT
51-year-old male with past medical history of hypertension presents with 1 month history of right-sided abdominal pain, right groin pain for the past 2 weeks, with pain radiating from right lower quadrant to right groin for the past week.  Patient states for the past 1-1/2 days the right groin pain has made him lose his appetite and feel uncomfortable throughout the day and so presents to ED for evaluation. Patient states he had a colonoscopy+endoscopy 2 years ago which was negative. Patient denies fever/chills, nausea/vomiting/diarrhea, irritative urinary symptoms, hematuria.

## 2022-11-28 NOTE — ED PROVIDER NOTE - PHYSICAL EXAMINATION
Physical Exam    Vital Signs: I have reviewed the initial vital signs.  Constitutional: appears stated age, no acute distress  Eyes: Conjunctiva pink, Sclera clear  ENT: OP is clear without exudate  Cardiovascular: S1 and S2, regular rate, regular rhythm, well-perfused extremities, radial pulses equal and 2+, pedal pulses 2+ and equal  Respiratory: unlabored respiratory effort, clear to auscultation bilaterally  Gastrointestinal: soft, abdomen is mildly tender to RLQ. No pulsatile mass, normal bowl sounds  Musculoskeletal: no lower extremity edema  Integumentary: warm, dry, no rash  Neurologic: awake, alert, extremities’ motor and sensory functions grossly intact  Psychiatric: appropriate mood, appropriate affect

## 2022-11-28 NOTE — ED PROVIDER NOTE - NSFOLLOWUPINSTRUCTIONS_ED_ALL_ED_FT
Follow-up with your primary care doctor in 2-3 days.    Abdominal Pain    Many things can cause abdominal pain. Many times, abdominal pain is not caused by a disease and will improve without treatment. Your health care provider will do a physical exam to determine if there is a dangerous cause of your pain; blood tests and imaging may help determine the cause of your pain. However, in many cases, no cause may be found and you may need further testing as an outpatient. Monitor your abdominal pain for any changes.     SEEK IMMEDIATE MEDICAL CARE IF YOU HAVE ANY OF THE FOLLOWING SYMPTOMS: worsening abdominal pain, uncontrollable vomiting, profuse diarrhea, inability to have bowel movements or pass gas, black or bloody stools, fever accompanying chest pain or back pain, or fainting. These symptoms may represent a serious problem that is an emergency. Do not wait to see if the symptoms will go away. Get medical help right away. Call 911 and do not drive yourself to the hospital.

## 2022-11-28 NOTE — ED PROVIDER NOTE - ATTENDING APP SHARED VISIT CONTRIBUTION OF CARE
51-year-old male presents for evaluation of right-sided abdominal/flank pain on and off for the last month.  States pain felt worse today.  No nausea vomiting or diarrhea, no fever or chills, on exam patient in NAD, AAOx3, abdomen soft nontender nondistended, no rash, no CVA tenderness

## 2022-11-29 VITALS
RESPIRATION RATE: 18 BRPM | HEART RATE: 66 BPM | OXYGEN SATURATION: 100 % | TEMPERATURE: 98 F | SYSTOLIC BLOOD PRESSURE: 155 MMHG | DIASTOLIC BLOOD PRESSURE: 88 MMHG

## 2022-11-29 LAB
APPEARANCE UR: CLEAR — SIGNIFICANT CHANGE UP
BILIRUB UR-MCNC: NEGATIVE — SIGNIFICANT CHANGE UP
COLOR SPEC: YELLOW — SIGNIFICANT CHANGE UP
DIFF PNL FLD: NEGATIVE — SIGNIFICANT CHANGE UP
GLUCOSE UR QL: NEGATIVE MG/DL — SIGNIFICANT CHANGE UP
KETONES UR-MCNC: NEGATIVE — SIGNIFICANT CHANGE UP
LEUKOCYTE ESTERASE UR-ACNC: NEGATIVE — SIGNIFICANT CHANGE UP
NITRITE UR-MCNC: NEGATIVE — SIGNIFICANT CHANGE UP
PH UR: 5.5 — SIGNIFICANT CHANGE UP (ref 5–8)
PROT UR-MCNC: NEGATIVE MG/DL — SIGNIFICANT CHANGE UP
SP GR SPEC: 1.02 — SIGNIFICANT CHANGE UP (ref 1.01–1.03)
UROBILINOGEN FLD QL: 0.2 MG/DL — SIGNIFICANT CHANGE UP

## 2022-11-29 PROCEDURE — 74177 CT ABD & PELVIS W/CONTRAST: CPT | Mod: 26,MA

## 2022-11-30 LAB
CULTURE RESULTS: SIGNIFICANT CHANGE UP
SPECIMEN SOURCE: SIGNIFICANT CHANGE UP

## 2023-06-08 NOTE — ED ADULT NURSE NOTE - NS ED NURSE DISCH DISPOSITION
Anesthesia Evaluation     Patient summary reviewed   no history of anesthetic complications:   NPO Solid Status: > 8 hours  NPO Liquid Status: > 2 hours           Airway   Mallampati: III  TM distance: >3 FB  Neck ROM: full  Possible difficult intubation  Dental - normal exam         Pulmonary - normal exam   (+) ,recent URI, sleep apnea on CPAP  (-) not a smoker  Cardiovascular - normal exam  Exercise tolerance: good (4-7 METS)    Patient on routine beta blocker and Beta blocker given within 24 hours of surgery  Rhythm: regular  Rate: normal    (+) hypertension 2 medications or greater      Neuro/Psych  (+) numbness, psychiatric history Anxiety  GI/Hepatic/Renal/Endo    (+) obesity, GERD, liver disease cirrhosis, diabetes mellitus type 2    Musculoskeletal     (+) chronic pain  Abdominal   (+) obese   Substance History   (+) alcohol use  (-) drug use     OB/GYN negative ob/gyn ROS         Other                        Anesthesia Plan    ASA 3     general   total IV anesthesia  intravenous induction     Anesthetic plan, risks, benefits, and alternatives have been provided, discussed and informed consent has been obtained with: patient.      CODE STATUS:       
Discharged

## 2023-09-21 NOTE — PROGRESS NOTE ADULT - PROVIDER SPECIALTY LIST ADULT
Hospitalist Eucrisa Counseling: Patient may experience a mild burning sensation during topical application. Eucrisa is not approved in children less than 2 years of age.
